# Patient Record
Sex: MALE | Race: WHITE | NOT HISPANIC OR LATINO | URBAN - METROPOLITAN AREA
[De-identification: names, ages, dates, MRNs, and addresses within clinical notes are randomized per-mention and may not be internally consistent; named-entity substitution may affect disease eponyms.]

---

## 2019-01-11 ENCOUNTER — OUTPATIENT (OUTPATIENT)
Dept: OUTPATIENT SERVICES | Facility: HOSPITAL | Age: 59
LOS: 1 days | End: 2019-01-11
Payer: COMMERCIAL

## 2019-01-11 VITALS
WEIGHT: 201.06 LBS | RESPIRATION RATE: 16 BRPM | DIASTOLIC BLOOD PRESSURE: 92 MMHG | HEIGHT: 68 IN | TEMPERATURE: 98 F | SYSTOLIC BLOOD PRESSURE: 152 MMHG | OXYGEN SATURATION: 98 % | HEART RATE: 57 BPM

## 2019-01-11 DIAGNOSIS — K40.30 UNILATERAL INGUINAL HERNIA, WITH OBSTRUCTION, WITHOUT GANGRENE, NOT SPECIFIED AS RECURRENT: ICD-10-CM

## 2019-01-11 DIAGNOSIS — Z92.89 PERSONAL HISTORY OF OTHER MEDICAL TREATMENT: Chronic | ICD-10-CM

## 2019-01-11 DIAGNOSIS — Z01.818 ENCOUNTER FOR OTHER PREPROCEDURAL EXAMINATION: ICD-10-CM

## 2019-01-11 DIAGNOSIS — E03.9 HYPOTHYROIDISM, UNSPECIFIED: ICD-10-CM

## 2019-01-11 LAB
ALBUMIN SERPL ELPH-MCNC: 4.2 G/DL — SIGNIFICANT CHANGE UP (ref 3.3–5)
ALP SERPL-CCNC: 52 U/L — SIGNIFICANT CHANGE UP (ref 40–120)
ALT FLD-CCNC: 37 U/L — SIGNIFICANT CHANGE UP (ref 12–78)
ANION GAP SERPL CALC-SCNC: 7 MMOL/L — SIGNIFICANT CHANGE UP (ref 5–17)
AST SERPL-CCNC: 19 U/L — SIGNIFICANT CHANGE UP (ref 15–37)
BILIRUB SERPL-MCNC: 0.5 MG/DL — SIGNIFICANT CHANGE UP (ref 0.2–1.2)
BUN SERPL-MCNC: 23 MG/DL — SIGNIFICANT CHANGE UP (ref 7–23)
CALCIUM SERPL-MCNC: 8.6 MG/DL — SIGNIFICANT CHANGE UP (ref 8.5–10.1)
CHLORIDE SERPL-SCNC: 108 MMOL/L — SIGNIFICANT CHANGE UP (ref 96–108)
CO2 SERPL-SCNC: 29 MMOL/L — SIGNIFICANT CHANGE UP (ref 22–31)
CREAT SERPL-MCNC: 0.9 MG/DL — SIGNIFICANT CHANGE UP (ref 0.5–1.3)
GLUCOSE SERPL-MCNC: 123 MG/DL — HIGH (ref 70–99)
HCT VFR BLD CALC: 39.9 % — SIGNIFICANT CHANGE UP (ref 39–50)
HGB BLD-MCNC: 13.9 G/DL — SIGNIFICANT CHANGE UP (ref 13–17)
MCHC RBC-ENTMCNC: 29.4 PG — SIGNIFICANT CHANGE UP (ref 27–34)
MCHC RBC-ENTMCNC: 34.8 GM/DL — SIGNIFICANT CHANGE UP (ref 32–36)
MCV RBC AUTO: 84.4 FL — SIGNIFICANT CHANGE UP (ref 80–100)
NRBC # BLD: 0 /100 WBCS — SIGNIFICANT CHANGE UP (ref 0–0)
PLATELET # BLD AUTO: 252 K/UL — SIGNIFICANT CHANGE UP (ref 150–400)
POTASSIUM SERPL-MCNC: 3.4 MMOL/L — LOW (ref 3.5–5.3)
POTASSIUM SERPL-SCNC: 3.4 MMOL/L — LOW (ref 3.5–5.3)
PROT SERPL-MCNC: 7 G/DL — SIGNIFICANT CHANGE UP (ref 6–8.3)
RBC # BLD: 4.73 M/UL — SIGNIFICANT CHANGE UP (ref 4.2–5.8)
RBC # FLD: 13.1 % — SIGNIFICANT CHANGE UP (ref 10.3–14.5)
SODIUM SERPL-SCNC: 144 MMOL/L — SIGNIFICANT CHANGE UP (ref 135–145)
WBC # BLD: 6.03 K/UL — SIGNIFICANT CHANGE UP (ref 3.8–10.5)
WBC # FLD AUTO: 6.03 K/UL — SIGNIFICANT CHANGE UP (ref 3.8–10.5)

## 2019-01-11 PROCEDURE — 36415 COLL VENOUS BLD VENIPUNCTURE: CPT

## 2019-01-11 PROCEDURE — 85027 COMPLETE CBC AUTOMATED: CPT

## 2019-01-11 PROCEDURE — 93010 ELECTROCARDIOGRAM REPORT: CPT | Mod: NC

## 2019-01-11 PROCEDURE — 93005 ELECTROCARDIOGRAM TRACING: CPT

## 2019-01-11 PROCEDURE — 80053 COMPREHEN METABOLIC PANEL: CPT

## 2019-01-11 PROCEDURE — G0463: CPT

## 2019-01-11 NOTE — H&P PST ADULT - FAMILY HISTORY
Mother  Still living? No  Family history of cerebrovascular accident (CVA), Age at diagnosis: Age Unknown     Father  Still living? Unknown  Family history of heart disease, Age at diagnosis: Age Unknown     Sibling  Still living? Yes, Estimated age: Age Unknown  Family history of type 2 diabetes mellitus, Age at diagnosis: Age Unknown

## 2019-01-11 NOTE — H&P PST ADULT - NSANTHOSAYNRD_GEN_A_CORE
No. ABENA screening performed.  STOP BANG Legend: 0-2 = LOW Risk; 3-4 = INTERMEDIATE Risk; 5-8 = HIGH Risk

## 2019-01-11 NOTE — H&P PST ADULT - HISTORY OF PRESENT ILLNESS
59 yo male scheduled for Right Inguinal Hernia Repair on 1/23/19 with Dr Duran.  Patient states that he has had the right inguinal hernia for a few weeks.  Patient has little pain intermittently. 57 yo male scheduled for Right Inguinal Hernia Repair on 1/23/19 with Dr Duran.  Patient states that he has had the right inguinal hernia for a few weeks.  Patient has a little pain intermittently.

## 2019-01-11 NOTE — H&P PST ADULT - PMH
Hypercholesterolemia    Hypothyroidism    Unilateral inguinal hernia with obstruction and without gangrene, recurrence not specified

## 2019-01-23 ENCOUNTER — OUTPATIENT (OUTPATIENT)
Dept: OUTPATIENT SERVICES | Facility: HOSPITAL | Age: 59
LOS: 1 days | End: 2019-01-23
Payer: COMMERCIAL

## 2019-01-23 VITALS
HEART RATE: 69 BPM | SYSTOLIC BLOOD PRESSURE: 119 MMHG | DIASTOLIC BLOOD PRESSURE: 69 MMHG | TEMPERATURE: 98 F | RESPIRATION RATE: 16 BRPM | OXYGEN SATURATION: 98 %

## 2019-01-23 VITALS
HEART RATE: 61 BPM | DIASTOLIC BLOOD PRESSURE: 71 MMHG | SYSTOLIC BLOOD PRESSURE: 122 MMHG | HEIGHT: 68 IN | TEMPERATURE: 98 F | OXYGEN SATURATION: 96 % | WEIGHT: 201.06 LBS | RESPIRATION RATE: 14 BRPM

## 2019-01-23 DIAGNOSIS — Z92.89 PERSONAL HISTORY OF OTHER MEDICAL TREATMENT: Chronic | ICD-10-CM

## 2019-01-23 DIAGNOSIS — K40.30 UNILATERAL INGUINAL HERNIA, WITH OBSTRUCTION, WITHOUT GANGRENE, NOT SPECIFIED AS RECURRENT: ICD-10-CM

## 2019-01-23 DIAGNOSIS — Z01.818 ENCOUNTER FOR OTHER PREPROCEDURAL EXAMINATION: ICD-10-CM

## 2019-01-23 PROCEDURE — 88304 TISSUE EXAM BY PATHOLOGIST: CPT | Mod: 26

## 2019-01-23 PROCEDURE — 49507 PRP I/HERN INIT BLOCK >5 YR: CPT | Mod: RT

## 2019-01-23 PROCEDURE — 88304 TISSUE EXAM BY PATHOLOGIST: CPT

## 2019-01-23 PROCEDURE — C1781: CPT

## 2019-01-23 RX ORDER — HYDROCODONE BITARTRATE AND ACETAMINOPHEN 7.5; 325 MG/15ML; MG/15ML
2 SOLUTION ORAL
Qty: 32 | Refills: 0
Start: 2019-01-23 | End: 2019-01-26

## 2019-01-23 RX ORDER — OXYCODONE HYDROCHLORIDE 5 MG/1
5 TABLET ORAL ONCE
Qty: 0 | Refills: 0 | Status: DISCONTINUED | OUTPATIENT
Start: 2019-01-23 | End: 2019-01-23

## 2019-01-23 RX ORDER — HYDROMORPHONE HYDROCHLORIDE 2 MG/ML
0.5 INJECTION INTRAMUSCULAR; INTRAVENOUS; SUBCUTANEOUS
Qty: 0 | Refills: 0 | Status: DISCONTINUED | OUTPATIENT
Start: 2019-01-23 | End: 2019-01-23

## 2019-01-23 RX ORDER — SODIUM CHLORIDE 9 MG/ML
1000 INJECTION, SOLUTION INTRAVENOUS
Qty: 0 | Refills: 0 | Status: DISCONTINUED | OUTPATIENT
Start: 2019-01-23 | End: 2019-01-23

## 2019-01-23 RX ORDER — ONDANSETRON 8 MG/1
4 TABLET, FILM COATED ORAL ONCE
Qty: 0 | Refills: 0 | Status: DISCONTINUED | OUTPATIENT
Start: 2019-01-23 | End: 2019-01-23

## 2019-01-23 RX ORDER — CEFAZOLIN SODIUM 1 G
1000 VIAL (EA) INJECTION ONCE
Qty: 0 | Refills: 0 | Status: COMPLETED | OUTPATIENT
Start: 2019-01-23 | End: 2019-01-23

## 2019-01-23 RX ADMIN — SODIUM CHLORIDE 75 MILLILITER(S): 9 INJECTION, SOLUTION INTRAVENOUS at 08:00

## 2019-01-23 NOTE — BRIEF OPERATIVE NOTE - POST-OP DX
Inguinal hernia  01/23/2019  Incarcerated Right Inguinal Hernia with Lipoma Spermatic Cord  Active  Mj Duran

## 2019-01-23 NOTE — BRIEF OPERATIVE NOTE - COMMENTS
Repair Incarcerated  Right Inguinal Hernia with Mesh. Excision Lipoma Cord, Ilioinguinal Nerve Block.

## 2019-01-23 NOTE — ASU DISCHARGE PLAN (ADULT/PEDIATRIC). - MEDICATION SUMMARY - MEDICATIONS TO TAKE
I will START or STAY ON the medications listed below when I get home from the hospital:    aspirin 81 mg oral tablet  -- 1 tab(s) by mouth once a day  -- Indication: For PROPHYLACTIC     Tylenol 325 mg oral tablet  -- 2 tab(s) by mouth every 4 hours, As Needed  -- Indication: For MILD TO MODERATE PAIN     Norco 5 mg-325 mg oral tablet  -- 2 tab(s) by mouth every 6 hours, As Needed -for severe pain MDD:8   -- Caution federal law prohibits the transfer of this drug to any person other  than the person for whom it was prescribed.  May cause drowsiness.  Alcohol may intensify this effect.  Use care when operating dangerous machinery.  This product contains acetaminophen.  Do not use  with any other product containing acetaminophen to prevent possible liver damage.  Using more of this medication than prescribed may cause serious breathing problems.    -- Indication: For SEVERE PAIN     Milk of Magnesia  -- 30 CC BY MOUTH  FOR CONSTIPATION    -- Indication: For CONSTIPATION     fenofibrate  -- orally once a day  -- Indication: For HIGH CHOLESTROL     Synthroid 175 mcg (0.175 mg) oral tablet  -- 1 tab(s) by mouth once a day    175/150 alt dose   -- Indication: For THYROID     multivitamin  -- orally once a day  -- Indication: For VITAMIN

## 2019-01-24 LAB — SURGICAL PATHOLOGY STUDY: SIGNIFICANT CHANGE UP

## 2019-12-17 ENCOUNTER — TRANSCRIPTION ENCOUNTER (OUTPATIENT)
Age: 59
End: 2019-12-17

## 2022-06-22 PROBLEM — Z00.00 ENCOUNTER FOR PREVENTIVE HEALTH EXAMINATION: Status: ACTIVE | Noted: 2022-06-22

## 2023-08-02 PROBLEM — E78.00 PURE HYPERCHOLESTEROLEMIA, UNSPECIFIED: Chronic | Status: ACTIVE | Noted: 2019-01-10

## 2023-08-02 PROBLEM — K40.30 UNILATERAL INGUINAL HERNIA, WITH OBSTRUCTION, WITHOUT GANGRENE, NOT SPECIFIED AS RECURRENT: Chronic | Status: ACTIVE | Noted: 2019-01-11

## 2023-08-02 PROBLEM — E03.9 HYPOTHYROIDISM, UNSPECIFIED: Chronic | Status: ACTIVE | Noted: 2019-01-10

## 2023-08-09 ENCOUNTER — APPOINTMENT (OUTPATIENT)
Dept: ELECTROPHYSIOLOGY | Facility: CLINIC | Age: 63
End: 2023-08-09

## 2023-08-10 ENCOUNTER — NON-APPOINTMENT (OUTPATIENT)
Age: 63
End: 2023-08-10

## 2023-08-10 ENCOUNTER — APPOINTMENT (OUTPATIENT)
Dept: ELECTROPHYSIOLOGY | Facility: CLINIC | Age: 63
End: 2023-08-10
Payer: COMMERCIAL

## 2023-08-10 VITALS — TEMPERATURE: 98.3 F | DIASTOLIC BLOOD PRESSURE: 68 MMHG | SYSTOLIC BLOOD PRESSURE: 118 MMHG

## 2023-08-10 VITALS
OXYGEN SATURATION: 95 % | DIASTOLIC BLOOD PRESSURE: 60 MMHG | SYSTOLIC BLOOD PRESSURE: 110 MMHG | HEART RATE: 60 BPM | HEIGHT: 68 IN | BODY MASS INDEX: 30.16 KG/M2 | WEIGHT: 199 LBS | RESPIRATION RATE: 14 BRPM

## 2023-08-10 DIAGNOSIS — G45.9 TRANSIENT CEREBRAL ISCHEMIC ATTACK, UNSPECIFIED: ICD-10-CM

## 2023-08-10 DIAGNOSIS — E07.9 DISORDER OF THYROID, UNSPECIFIED: ICD-10-CM

## 2023-08-10 DIAGNOSIS — I10 ESSENTIAL (PRIMARY) HYPERTENSION: ICD-10-CM

## 2023-08-10 DIAGNOSIS — Z86.69 PERSONAL HISTORY OF OTHER DISEASES OF THE NERVOUS SYSTEM AND SENSE ORGANS: ICD-10-CM

## 2023-08-10 DIAGNOSIS — I48.91 UNSPECIFIED ATRIAL FIBRILLATION: ICD-10-CM

## 2023-08-10 DIAGNOSIS — E78.5 HYPERLIPIDEMIA, UNSPECIFIED: ICD-10-CM

## 2023-08-10 PROCEDURE — 99244 OFF/OP CNSLTJ NEW/EST MOD 40: CPT

## 2023-08-10 PROCEDURE — 93000 ELECTROCARDIOGRAM COMPLETE: CPT

## 2023-08-10 RX ORDER — CHROMIUM 200 MCG
25 MCG TABLET ORAL
Refills: 0 | Status: ACTIVE | COMMUNITY
Start: 2023-08-10

## 2023-08-10 RX ORDER — LISINOPRIL 20 MG/1
20 TABLET ORAL DAILY
Refills: 0 | Status: ACTIVE | COMMUNITY
Start: 2023-08-10

## 2023-08-10 RX ORDER — GLUCOSAMINE/MSM/CHONDROIT SULF 500-166.6
595 (99 K) TABLET ORAL DAILY
Refills: 0 | Status: ACTIVE | COMMUNITY
Start: 2023-08-10

## 2023-08-10 RX ORDER — LEVOTHYROXINE SODIUM 150 UG/1
150 TABLET ORAL DAILY
Refills: 0 | Status: ACTIVE | COMMUNITY
Start: 2023-08-10

## 2023-08-10 RX ORDER — ATORVASTATIN CALCIUM 20 MG/1
20 TABLET, FILM COATED ORAL
Refills: 0 | Status: ACTIVE | COMMUNITY
Start: 2023-08-10

## 2023-08-10 RX ORDER — CHLORTHALIDONE 25 MG/1
25 TABLET ORAL DAILY
Refills: 0 | Status: ACTIVE | COMMUNITY
Start: 2023-08-10

## 2023-08-10 NOTE — PHYSICAL EXAM
[Well Developed] : well developed [Well Nourished] : well nourished [No Acute Distress] : no acute distress [Normal Conjunctiva] : normal conjunctiva [Normal Venous Pressure] : normal venous pressure [Normal S1, S2] : normal S1, S2 [No Murmur] : no murmur [Clear Lung Fields] : clear lung fields [Good Air Entry] : good air entry [No Respiratory Distress] : no respiratory distress  [Normal Gait] : normal gait [No Edema] : no edema [No Cyanosis] : no cyanosis [No Clubbing] : no clubbing [No Rash] : no rash [Moves all extremities] : moves all extremities [No Focal Deficits] : no focal deficits [Alert and Oriented] : alert and oriented

## 2023-08-10 NOTE — HISTORY OF PRESENT ILLNESS
[FreeTextEntry1] : 62 year old gentleman with history of TIA, HTN, HLD, thyroid nodule, ABENA, presenting for evaluation of atrial fibrillation.    In 4/2023 he presented with sudden onset double vision that lasted about 4 days. He was seen in [facility] and workup was negative, including MRI, MRA of brain and neck, as well as ophthalmologic workup. He was diagnosed with a TIA, and was initially started on ASA 81mg qd.   Subsequently he wore an event monitor which revealed paroxysmal AF with RVR.    Event monitor performed 5/2 - 5/16/23 revealed SR (avg 65, range  bpm) and paroxysmal AF with episodes lasting up to 2 hours and 23 minutes (avg rate 119, range  bpm) c/w 1% AF burden during monitoring   He denied symptoms during monitoring.    He was started on anticoagulation with Xarelto, and ASA was stopped. He is tolerating anticoagulation and denies significant bleeding.   TTE and prior stress testing have been normal.    He now endorses feeling occasional mild palpitation, which he describes as fluttering, for brief periods of time when he is laying down.  He denies other episodes of sustained or significantly bothersome palpitations.  He denies syncope.    He does have a history of sleep apnea and has in general been noncompliant with CPAP.  In addition he has been noted to have a thyroid nodule recent lab work was notable for a TSH of 0.43 (within normal range) however he had elevated thyroglobulin antibodies as well as TPO.  He is awaiting an evaluation with an endocrinologist.     Current meds include Xarelto 20mg qd, lisinopril, chlorthaldione, atorvastatin, Synthroid

## 2023-08-10 NOTE — CARDIOLOGY SUMMARY
[de-identified] : 8/10/23 sinus rhythm 60 bpm, narrow QRS [de-identified] : ETT 10/12/21 no ischemia or induced arrhythmia. HR increased from 58 to 144 bpm. 11 METS  [de-identified] : TTE 5/1/23 LVEF 62%, LA 3.5cm, mod MR, mild TR, RVSP 25,

## 2023-08-10 NOTE — DISCUSSION/SUMMARY
[FreeTextEntry1] : 62 year old gentleman with history of TIA, HTN, HLD, thyroid nodule, ABENA, presenting for evaluation of atrial fibrillation. He was recently found to have paroxysmal atrial fibrillation after presenting with a suspected TIA.  He now reports mild symptoms of infrequent palpitation.  Due to his suspected TIA he is now on anticoagulation, which he is tolerating.  We did discuss atrial fibrillation in detail, associated risks and risks of long-term morbidity.  We did discuss management options for atrial fibrillation in detail, including medical therapy as well as atrial fibrillation ablation.  As initial strategy we discussed the importance of risk factor control, including treatment of sleep apnea and treatment of thyroid disease as is indicated.  He reports he will follow-up with his sleep medicine doctor and consider CPAP or other appropriate therapies.  He also has an upcoming appointment with an endocrinologist and may require treatment for his thyroid nodule.  If he continues to have atrial fibrillation despite this I would consider an early rhythm control strategy, and the risks and benefits of AF ablation were discussed.  Will plan to repeat monitoring in the future and reconsider rhythm control strategies if he continues to have AF, particularly if the AF burden and symptoms progress. We did also discuss monitoring strategies, and he is interested in considering ILR implant for long-term monitoring, in the future; I suspect this will be helpful to guide further management.      -continue Xarelto   -f/u sleep medicine and reinitiate treatment for ABENA   -endocrinology followup and treatment of thyroid disease/nodule    -repeat MCOT in around 3-5 months, and EP follow-up after this. Will consider ILR  [EKG obtained to assist in diagnosis and management of assessed problem(s)] : EKG obtained to assist in diagnosis and management of assessed problem(s)

## 2023-08-10 NOTE — REASON FOR VISIT
[Arrhythmia/ECG Abnorrmalities] : arrhythmia/ECG abnormalities [Spouse] : spouse [FreeTextEntry3] : Dr Ling

## 2023-08-10 NOTE — REVIEW OF SYSTEMS
[Seeing Double (Diplopia)] : diplopia [Palpitations] : palpitations [Negative] : Heme/Lymph [SOB] : no shortness of breath [Dyspnea on exertion] : not dyspnea during exertion [Chest Discomfort] : no chest discomfort [Lower Ext Edema] : no extremity edema [Leg Claudication] : no intermittent leg claudication [Syncope] : no syncope [Dizziness] : no dizziness [Convulsions] : no convulsions [FreeTextEntry3] : per HPI

## 2023-08-14 NOTE — H&P PST ADULT - WEIGHT IN KG
[facility]  [address] [place] [address]  [place] [address]  Phone: [phone]  Primary Provider: Rere Campuzano  Pre-op Performing Provider: RERE CAMPUZANO      PREOPERATIVE EVALUATION:  Today's date: 8/14/2023    Case Epstein is a 63 year old male who presents for a preoperative evaluation.      8/14/2023     9:31 AM   Additional Questions   Roomed by Zoe Plata       Surgical Information:  Surgery/Procedure: Left total hip replacment  Surgery Location: [place]  Surgeon: Jer  Surgery Date: 08/22/2023  Time of Surgery: 10:30am  Where patient plans to recover: At home with family  Fax number for surgical facility: Note does not need to be faxed, will be available electronically in Epic.         Subjective       HPI related to upcoming procedure: 63 year old with very long history of hip problems now to have left GUSTAVO.  He had right GUSTAVO in May this year.         8/7/2023     9:38 AM   Preop Questions   1. Have you ever had a heart attack or stroke? No   2. Have you ever had surgery on your heart or blood vessels, such as a stent placement, a coronary artery bypass, or surgery on an artery in your head, neck, heart, or legs? No   3. Do you have chest pain with activity? No   4. Do you have a history of  heart failure? No   5. Do you currently have a cold, bronchitis or symptoms of other infection? No   6. Do you have a cough, shortness of breath, or wheezing? No   7. Do you or anyone in your family have previous history of blood clots? No   8. Do you or does anyone in your family have a serious bleeding problem such as prolonged bleeding following surgeries or cuts? No   9. Have you ever had problems with anemia or been told to take iron pills? No   10. Have you had any abnormal blood loss such as black, tarry or bloody stools? No   11. Have you ever had a blood transfusion? No   12. Are you willing to have a blood transfusion if it is medically needed before, during, or after your  surgery? Yes   13. Have you or any of your relatives ever had problems with anesthesia? No   14. Do you have sleep apnea, excessive snoring or daytime drowsiness? No   15. Do you have any artifical heart valves or other implanted medical devices like a pacemaker, defibrillator, or continuous glucose monitor? No   16. Do you have artificial joints? YES - left GUSTAVO this year    17. Are you allergic to latex? No       Health Care Directive:  Patient does not have a Health Care Directive or Living Will:     Preoperative Review of :  Patient not on any controlled substances           Review of Systems  Constitutional, neuro, ENT, endocrine, pulmonary, cardiac, gastrointestinal, genitourinary, musculoskeletal, integument and psychiatric systems are negative, except as otherwise noted.    No recent illnesses    No problems after prior hip procedure on right.  That hip feels fine, no pain.    No cardiac history.     No chest pain/ breathing problesm.    No bleeeding or clotting disorders.    No anesthesia problems    Patient Active Problem List    Diagnosis Date Noted    Primary osteoarthritis of right hip 06/05/2023     Priority: Medium    Pain in joint, ankle and foot, left 01/08/2018     Priority: Medium    CARDIOVASCULAR SCREENING; LDL GOAL LESS THAN 100 08/16/2017     Priority: Medium    Advanced directives, counseling/discussion 07/03/2015     Priority: Medium     Advance Care Planning 7/7/2015: ACP Review and Resources Provided:  Reviewed chart for advance care plan.  Case Epstein has no plan or code status on file. Discussed available resources and provided with information. Confirmed code status reflects current choices pending further ACP discussions.  Confirmed/documented legally designated decision maker(s).  Added by Noemi Sales            Vitamin D deficiency disease 06/26/2013     Priority: Medium    Primary osteoarthritis of right knee 09/27/2011     Priority: Medium      Past Medical History:  "  Diagnosis Date    High cholesterol     OA (OSTEOARTHRITIS) OF KNEE - right 09/27/2011     Past Surgical History:   Procedure Laterality Date    ARTHRODESIS ANKLE Left 11/20/2017    Procedure: ARTHRODESIS ANKLE;  LEFT ANKLE FUSION (C-ARM);  Surgeon: Shaggy Linn MD;  Location: [facility]    ARTHROPLASTY HIP ANTERIOR Right 5/30/2023    Procedure: Right ARTHROPLASTY HIP TOTAL DIRECT ANTERIOR APPROACH;  Surgeon: Dennis Randle MD;  Location: [facility] OR    COLONOSCOPY      ORTHOPEDIC SURGERY Right     right TKA May 2021    ZZC AFF PALATE/UVULA SURGERY UNLISTED  age 2 and in 1986    2 procedures for palate/nasal issues     Current Outpatient Medications   Medication Sig Dispense Refill    acetaminophen (TYLENOL) 325 MG tablet Take 2 tablets (650 mg) by mouth every 4 hours as needed for other (mild pain) 100 tablet 0    amLODIPine (NORVASC) 5 MG tablet Take 1 tablet (5 mg) by mouth daily 90 tablet 3    aspirin (ASA) 325 MG EC tablet Take 1 tablet (325 mg) by mouth daily 30 tablet 0    ibuprofen (ADVIL/MOTRIN) 200 MG tablet Take 200 mg by mouth every 4 hours as needed for mild pain         No Known Allergies     Social History     Tobacco Use    Smoking status: Never    Smokeless tobacco: Never   Substance Use Topics    Alcohol use: No     Comment: occasional        History   Drug Use No         Objective     /72 (BP Location: Right arm, Patient Position: Chair, Cuff Size: Adult Regular)   Pulse 87   Temp 98  F (36.7  C) (Temporal)   Resp 16   Ht 1.753 m (5' 9\")   Wt 80.9 kg (178 lb 4 oz)   SpO2 96%   BMI 26.32 kg/m      Physical Exam    GENERAL APPEARANCE: healthy, alert and no distress     EYES: EOMI,  PERRL     HENT: ear canals and TM's normal and nose and mouth without ulcers or lesions.  He has chronic palate changes from past surgeries     NECK: no adenopathy, no asymmetry, masses, or scars and thyroid normal to palpation     RESP: lungs clear to auscultation - no rales, rhonchi or wheezes     CV: " regular rates and rhythm, normal S1 S2, no S3 or S4 and no murmur, click or rub     ABDOMEN:  soft, nontender, no HSM or masses and bowel sounds normal     MS: extremities normal- no gross deformities noted, no evidence of inflammation in joints, FROM in all extremities.     SKIN: no suspicious lesions or rashes     NEURO: Normal strength and tone, sensory exam grossly normal, mentation intact and speech normal     PSYCH: mentation appears normal. and affect normal/bright     LYMPHATICS: No cervical adenopathy    Recent Labs   Lab Test 05/31/23  0958 05/31/23  0511 04/24/23  0852   HGB  --  11.1*  11.1* 15.6   PLT  --  192 168     --  139   POTASSIUM 4.2  --  4.2   CR 0.92  --  0.82   A1C  --   --  5.3        Diagnostics:      No EKG needed given no cardiac history/ symptoms    Labs pending, cmp and cbc    ASSESSMENT / PLAN:  (Z01.818) Preop general physical exam  (primary encounter diagnosis)  Comment: patient in stable health overall.  He has no problems with right GUSTAVO in May.  Plan: Comprehensive metabolic panel, CBC with         Platelets & Differential             (M16.12) Primary osteoarthritis of left hip  Comment: as above   Plan: as above     Patient will hold the aspirin one week prior    Take amlodipine the day of surgery early in am       I reviewed the patient's medications, allergies, medical history, family history, and social history.    Tushar Granados MD      Revised Cardiac Risk Index (RCRI):  The patient has the following serious cardiovascular risks for perioperative complications:   - No serious cardiac risks = 0 points     RCRI Interpretation: 0 points: Class I (very low risk - 0.4% complication rate)         Signed Electronically by: Tushar Granados MD  Copy of this evaluation report is provided to requesting physician.        91.2

## 2023-12-19 ENCOUNTER — OUTPATIENT (OUTPATIENT)
Dept: OUTPATIENT SERVICES | Facility: HOSPITAL | Age: 63
LOS: 1 days | End: 2023-12-19
Payer: COMMERCIAL

## 2023-12-19 ENCOUNTER — TRANSCRIPTION ENCOUNTER (OUTPATIENT)
Age: 63
End: 2023-12-19

## 2023-12-19 VITALS
HEIGHT: 68 IN | TEMPERATURE: 98 F | WEIGHT: 199.96 LBS | HEART RATE: 52 BPM | RESPIRATION RATE: 18 BRPM | SYSTOLIC BLOOD PRESSURE: 135 MMHG | OXYGEN SATURATION: 99 % | DIASTOLIC BLOOD PRESSURE: 71 MMHG

## 2023-12-19 VITALS
SYSTOLIC BLOOD PRESSURE: 135 MMHG | HEART RATE: 53 BPM | TEMPERATURE: 98 F | HEIGHT: 68 IN | WEIGHT: 199.96 LBS | DIASTOLIC BLOOD PRESSURE: 71 MMHG | RESPIRATION RATE: 16 BRPM

## 2023-12-19 DIAGNOSIS — Z92.89 PERSONAL HISTORY OF OTHER MEDICAL TREATMENT: Chronic | ICD-10-CM

## 2023-12-19 DIAGNOSIS — I48.0 PAROXYSMAL ATRIAL FIBRILLATION: ICD-10-CM

## 2023-12-19 DIAGNOSIS — Z98.890 OTHER SPECIFIED POSTPROCEDURAL STATES: Chronic | ICD-10-CM

## 2023-12-19 PROCEDURE — 33285 INSJ SUBQ CAR RHYTHM MNTR: CPT

## 2023-12-19 PROCEDURE — C1764: CPT

## 2023-12-19 RX ORDER — ACETAMINOPHEN 500 MG
2 TABLET ORAL
Qty: 0 | Refills: 0 | DISCHARGE

## 2023-12-19 RX ORDER — MAGNESIUM HYDROXIDE 400 MG/1
0 TABLET, CHEWABLE ORAL
Qty: 0 | Refills: 0 | DISCHARGE

## 2023-12-19 RX ORDER — RIVAROXABAN 15 MG-20MG
1 KIT ORAL
Refills: 0 | DISCHARGE

## 2023-12-19 RX ORDER — LEVOTHYROXINE SODIUM 125 MCG
1 TABLET ORAL
Qty: 0 | Refills: 0 | DISCHARGE

## 2023-12-19 RX ORDER — CEPHALEXIN 500 MG
500 CAPSULE ORAL ONCE
Refills: 0 | Status: COMPLETED | OUTPATIENT
Start: 2023-12-19 | End: 2023-12-19

## 2023-12-19 RX ORDER — POTASSIUM CHLORIDE 20 MEQ
1 PACKET (EA) ORAL
Refills: 0 | DISCHARGE

## 2023-12-19 RX ORDER — ERGOCALCIFEROL 1.25 MG/1
1 CAPSULE ORAL
Refills: 0 | DISCHARGE

## 2023-12-19 RX ORDER — CHLORTHALIDONE 50 MG
1 TABLET ORAL
Refills: 0 | DISCHARGE

## 2023-12-19 RX ORDER — LISINOPRIL 2.5 MG/1
1 TABLET ORAL
Refills: 0 | DISCHARGE

## 2023-12-19 RX ORDER — FENOFIBRATE,MICRONIZED 130 MG
0 CAPSULE ORAL
Qty: 0 | Refills: 0 | DISCHARGE

## 2023-12-19 RX ORDER — ASPIRIN/CALCIUM CARB/MAGNESIUM 324 MG
1 TABLET ORAL
Qty: 0 | Refills: 0 | DISCHARGE

## 2023-12-19 RX ORDER — ATORVASTATIN CALCIUM 80 MG/1
1 TABLET, FILM COATED ORAL
Refills: 0 | DISCHARGE

## 2023-12-19 RX ORDER — LEVOTHYROXINE SODIUM 125 MCG
1 TABLET ORAL
Refills: 0 | DISCHARGE

## 2023-12-19 RX ADMIN — Medication 500 MILLIGRAM(S): at 07:23

## 2023-12-19 NOTE — ASU DISCHARGE PLAN (ADULT/PEDIATRIC) - NS MD DC FALL RISK RISK
For information on Fall & Injury Prevention, visit: [email or web address] OR  [email or web address]://[email or web address]-injury OR  https://www.cdc.gov/steadi/patient.html For information on Fall & Injury Prevention, visit: [email or web address]://[email or web address]-[email or web address]-mobility OR  [email or web address]://[email or web address].[email or web address]-[email or web address]-[email or web address]-avoid-injury OR  https://www.cdc.gov/steadi/patient.html

## 2023-12-19 NOTE — PROGRESS NOTE ADULT - SUBJECTIVE AND OBJECTIVE BOX
Procedure: MDT Loop recorder implant  Electrophysiologist: Saad Berry MD    Pt doing well s/p loop recorder implant. Denies any complaints post procedure.     Incision: dressing C/D/I; no bleeding, hematoma, erythema, exudate or edema    Plan:   Resume PO intake.   Ambulate w/ assist, then progress as tolerated.     Pain control with PO analgesia PRN.   Resume home medications.   D/C home once all criteria met, with outpt f/up in 1-2 weeks.

## 2023-12-19 NOTE — DISCHARGE NOTE NURSING/CASE MANAGEMENT/SOCIAL WORK - NSDCPEFALRISK_GEN_ALL_CORE
For information on Fall & Injury Prevention, visit: [email or web address]-[email or web address]-mobility OR  [email or web address]://[email or web address].[email or web address]/[email or web address]-prevention-tips-[email or web address]-injury OR  https://www.cdc.gov/steadi/patient.html For information on Fall & Injury Prevention, visit: [email or web address]://[email or web address]-[email or web address]-[email or web address]-mobility OR  [email or web address]://[email or web address]-tips-to-avoid-injury OR  https://www.cdc.gov/steadi/patient.html

## 2023-12-19 NOTE — ASU DISCHARGE PLAN (ADULT/PEDIATRIC) - PROVIDER TOKENS
PROVIDER:[TOKEN:[[id number]]],PROVIDER:[TOKEN:[[id number]]] PROVIDER:[TOKEN:[[id number]]],PROVIDER:[TOKEN:[[id number]]]

## 2023-12-19 NOTE — H&P PST ADULT - ASSESSMENT
Luis is a 62 y/o male, PMHx significant for TIA, HTN, HLD, thyroid nodule, ABENA, and PAF who presents today for elective loop recorder implant with Dr. Berry. In summary, pt was diagnosed with a TIA in 4/2023 after presenting with sudden onset double vision that lasted about 4 days. He was seen in [facility] and workup was negative, including MRI, MRA of brain and neck, as well as ophthalmologic workup. He was diagnosed with a TIA, and was initially started on ASA 81mg qd. Subsequently he wore an event monitor which revealed paroxysmal AF with RVR. Event monitor performed 5/2 - 5/16/23 revealed SR (avg 65, range  bpm) and paroxysmal AF with episodes lasting up to 2 hours and 23 minutes (avg rate 119, range  bpm) c/w 1% AF burden during monitoring. He was started on anticoagulation with Xarelto, and ASA was stopped. Pt still reports intermittent palpitations which sometimes are exacerbated with physical activity. Pt now presents electively for ILR implant to help guide arrythmia management options.     Plan:  1)Loop recorder implant  - Consent with attending  - Keflex 500mg prior to procedure   - Site prep per protocol     Luis is a 64 y/o male, PMHx significant for TIA, HTN, HLD, thyroid nodule, ABENA, and PAF who presents today for elective loop recorder implant with Dr. Berry. In summary, pt was diagnosed with a TIA in 4/2023 after presenting with sudden onset double vision that lasted about 4 days. He was seen in [facility] and workup was negative, including MRI, MRA of brain and neck, as well as ophthalmologic workup. He was diagnosed with a TIA, and was initially started on ASA 81mg qd. Subsequently he wore an event monitor which revealed paroxysmal AF with RVR. Event monitor performed 5/2 - 5/16/23 revealed SR (avg 65, range  bpm) and paroxysmal AF with episodes lasting up to 2 hours and 23 minutes (avg rate 119, range  bpm) c/w 1% AF burden during monitoring. He was started on anticoagulation with Xarelto, and ASA was stopped. Pt still reports intermittent palpitations which sometimes are exacerbated with physical activity. Pt now presents electively for ILR implant to help guide arrythmia management options.     Plan:  1)Loop recorder implant  - Consent with attending  - Keflex 500mg prior to procedure   - Site prep per protocol

## 2023-12-19 NOTE — H&P PST ADULT - NSICDXPASTMEDICALHX_GEN_ALL_CORE_FT
PAST MEDICAL HISTORY:  Hypercholesterolemia     Hypothyroidism     PAF (paroxysmal atrial fibrillation)     Unilateral inguinal hernia with obstruction and without gangrene, recurrence not specified

## 2023-12-19 NOTE — H&P PST ADULT - NSICDXFAMILYHX_GEN_ALL_CORE_FT
FAMILY HISTORY:  Father  Still living? Unknown  Family history of heart disease, Age at diagnosis: Age Unknown    Mother  Still living? No  Family history of cerebrovascular accident (CVA), Age at diagnosis: Age Unknown    Sibling  Still living? Yes, Estimated age: Age Unknown  Family history of type 2 diabetes mellitus, Age at diagnosis: Age Unknown

## 2023-12-19 NOTE — DISCHARGE NOTE NURSING/CASE MANAGEMENT/SOCIAL WORK - PATIENT PORTAL LINK FT
You can access the FollowMyHealth Patient Portal offered by [facility] by registering at the following website: [email or web address]. By joining [email or web address]’s FollowMyHealth portal, you will also be able to view your health information using other applications (apps) compatible with our system. You can access the FollowMyHealth Patient Portal offered by [facility] by registering at the following website: [email or web address]. By joining "[employer]’s FollowMyHealth portal, you will also be able to view your health information using other applications (apps) compatible with our system.

## 2023-12-19 NOTE — H&P PST ADULT - HISTORY OF PRESENT ILLNESS
Luis is a 62 y/o male, PMHx significant for TIA, HTN, HLD, thyroid nodule, ABENA, and PAF who presents today for elective loop recorder implant with Dr. Berry. In summary, pt was diagnosed with a TIA in 2023 after presenting with sudden onset double vision that lasted about 4 days. He was seen in [facility] and workup was negative, including MRI, MRA of brain and neck, as well as ophthalmologic workup. He was diagnosed with a TIA, and was initially started on ASA 81mg qd. Subsequently he wore an event monitor which revealed paroxysmal AF with RVR. Event monitor performed  - 23 revealed SR (avg 65, range  bpm) and paroxysmal AF with episodes lasting up to 2 hours and 23 minutes (avg rate 119, range  bpm) c/w 1% AF burden during monitoring. He was started on anticoagulation with Xarelto, and ASA was stopped. Pt still reports intermittent palpitations which sometimes are exacerbated with physical activity. Pt now presents electively for ILR implant to help guide arrythmia management options.       Cardiology Summary:   EC/10/23 sinus rhythm 60 bpm, narrow QRS    Stress Test: ETT 10/12/21 no ischemia or induced arrhythmia. HR increased from 58 to 144 bpm. 11 METS    Echo: TTE 23 LVEF 62%, LA 3.5cm, mod MR, mild TR, RVSP 25, Luis is a 64 y/o male, PMHx significant for TIA, HTN, HLD, thyroid nodule, ABENA, and PAF who presents today for elective loop recorder implant with Dr. Berry. In summary, pt was diagnosed with a TIA in 2023 after presenting with sudden onset double vision that lasted about 4 days. He was seen in [facility] and workup was negative, including MRI, MRA of brain and neck, as well as ophthalmologic workup. He was diagnosed with a TIA, and was initially started on ASA 81mg qd. Subsequently he wore an event monitor which revealed paroxysmal AF with RVR. Event monitor performed  - 23 revealed SR (avg 65, range  bpm) and paroxysmal AF with episodes lasting up to 2 hours and 23 minutes (avg rate 119, range  bpm) c/w 1% AF burden during monitoring. He was started on anticoagulation with Xarelto, and ASA was stopped. Pt still reports intermittent palpitations which sometimes are exacerbated with physical activity. Pt now presents electively for ILR implant to help guide arrythmia management options.       Cardiology Summary:   EC/10/23 sinus rhythm 60 bpm, narrow QRS    Stress Test: ETT 10/12/21 no ischemia or induced arrhythmia. HR increased from 58 to 144 bpm. 11 METS    Echo: TTE 23 LVEF 62%, LA 3.5cm, mod MR, mild TR, RVSP 25,

## 2023-12-19 NOTE — ASU DISCHARGE PLAN (ADULT/PEDIATRIC) - CARE PROVIDER_API CALL
Romulo Ling  Cardiology  [address], [address]  [place] [address]  Phone: [phone]  Fax: [phone]  Follow Up Time:     Saad Berry  Cardiac Electrophysiology  [address] [place] [id number]  Phone: [phone]  Fax: [phone]  Follow Up Time:    Romulo Ling  Cardiology  [address], [address]  [place] [address]  Phone: [phone]  Fax: [phone]  Follow Up Time:     Saad Berry  Cardiac Electrophysiology  [address] [place] [address]  Phone: [phone]  Fax: [phone]  Follow Up Time:

## 2023-12-19 NOTE — ASU DISCHARGE PLAN (ADULT/PEDIATRIC) - COMMENTS
Follow up with Marianne Mendez NP at [facility] in 2-3 weeks for loop recorder check. Please call [phone] to schedule an appointment. Follow up with Marianne Mendez NP at [facility] in 2-3 weeks for loop recorder check. Please call [phone] to schedule an appointment.

## 2024-01-11 PROBLEM — I48.0 PAROXYSMAL ATRIAL FIBRILLATION: Chronic | Status: ACTIVE | Noted: 2023-12-19

## 2024-01-12 ENCOUNTER — APPOINTMENT (OUTPATIENT)
Dept: ORTHOPEDIC SURGERY | Facility: CLINIC | Age: 64
End: 2024-01-12
Payer: COMMERCIAL

## 2024-01-12 VITALS — HEIGHT: 68 IN | BODY MASS INDEX: 30.16 KG/M2 | WEIGHT: 199 LBS

## 2024-01-12 DIAGNOSIS — M10.062 IDIOPATHIC GOUT, LEFT KNEE: ICD-10-CM

## 2024-01-12 PROCEDURE — 99203 OFFICE O/P NEW LOW 30 MIN: CPT

## 2024-01-12 RX ORDER — METHYLPREDNISOLONE 4 MG/1
4 TABLET ORAL
Qty: 1 | Refills: 0 | Status: ACTIVE | COMMUNITY
Start: 2024-01-12 | End: 1900-01-01

## 2024-01-12 NOTE — ASSESSMENT
[FreeTextEntry1] : 2-week history of left knee pain with associated swelling.  Has had gout in the past and tells me that this feels like a typical attack.  He tells me this is managed by his general practitioner but lately the medications have not been working.  We discussed a referral to see a rheumatologist will he is adamant about this.  We discussed an x-ray to assess any other causes for the swelling he is adamant about this as well.  Explained gout is a medical issue and usually best handled by a rheumatologist or his general practitioner.  He verbalized understanding.  Will start him on a Medrol Dosepak but I explained that the disease needs to be managed with maintenance medications and when the maintenance medications are not working different medications could be instituted.  He will follow-up as needed with me at this time   The patient's current medication management of their orthopedic diagnosis was reviewed today:(1) We discussed a comprehensive treatment plan that included pharmaceutical management involving the use of prescription medications. (2) There is a moderate risk of morbidity with further treatment, especially from use of prescription strength medications and possible side effects of these medications which include upset stomach with oral medications, skin reactions to topical medications and cardiac/renal/diabetes issues with long term use. (3) I recommended that the patient follow-up with their medical physician to discuss any significant specific potential issues with long term medication use such as interactions with current medications or with exacerbation of underlying medical comorbidities. (4) The benefits and risks associated with use of injectable, oral or topical, prescription and over the counter anti-inflammatory medications were discussed with the patient. The patient voiced understanding of the risks including but not limited to bleeding, stroke, kidney dysfunction, heart disease, and were referred to the black box warning label for further information.

## 2024-01-12 NOTE — IMAGING
[de-identified] : Mild effusion, no warmth, no ecchymosis Medial and lateral joint line tenderness to palpation Range of motion 0-110 5/5 quadriceps and hamstring strength Positive [facility] No varus or valgus instability, negative lachman Motor and sensory intact distally Mildly antalgic gait

## 2024-01-12 NOTE — HISTORY OF PRESENT ILLNESS
[9] : 9 [5] : 5 [Dull/Aching] : dull/aching [Sharp] : sharp [Intermittent] : intermittent [Nothing helps with pain getting better] : Nothing helps with pain getting better [de-identified] : 1/12/2024: LT Knee pain for years, Pt has been treated for gout in knee in the past. Has not had a Gout flare-up for 2 weeks. Denies injury, denies N/T.  [] : no [FreeTextEntry1] : LT Knee

## 2024-02-06 DIAGNOSIS — Z86.69 PERSONAL HISTORY OF OTHER DISEASES OF THE NERVOUS SYSTEM AND SENSE ORGANS: ICD-10-CM

## 2024-02-06 DIAGNOSIS — Z86.79 PERSONAL HISTORY OF OTHER DISEASES OF THE CIRCULATORY SYSTEM: ICD-10-CM

## 2024-02-06 RX ORDER — RIVAROXABAN 20 MG/1
20 TABLET, FILM COATED ORAL
Qty: 90 | Refills: 2 | Status: ACTIVE | COMMUNITY
Start: 2023-08-10

## 2024-02-07 ENCOUNTER — APPOINTMENT (OUTPATIENT)
Dept: ELECTROPHYSIOLOGY | Facility: CLINIC | Age: 64
End: 2024-02-07
Payer: COMMERCIAL

## 2024-02-07 VITALS
BODY MASS INDEX: 30.16 KG/M2 | HEIGHT: 68 IN | DIASTOLIC BLOOD PRESSURE: 76 MMHG | HEART RATE: 53 BPM | SYSTOLIC BLOOD PRESSURE: 124 MMHG | WEIGHT: 199 LBS

## 2024-02-07 PROCEDURE — 93000 ELECTROCARDIOGRAM COMPLETE: CPT

## 2024-02-07 PROCEDURE — 99215 OFFICE O/P EST HI 40 MIN: CPT

## 2024-02-07 PROCEDURE — G2211 COMPLEX E/M VISIT ADD ON: CPT

## 2024-02-07 NOTE — DISCUSSION/SUMMARY
[FreeTextEntry1] : 63 year old gentleman with history of TIA, HTN, HLD, thyroid nodule, ABENA, and paroxysmal atrial fibrillation presenting for followup s/p ILR implant 12/19/23. He has recently had a few episodes of symptomatic pAF with RVR, with last episode confirmed on ILR implant lasting >4 hours. He reports mild-moderate associated symptoms, and episodes recently every 1-2 months.  We again discussed AF in detail, and management options. He is reluctant to take additional medications at this point, though he is tolerating anticoagulation and will continue this. We discussed the pros and cons of an early rhythm control strategy, and AF ablation. The risks and benefits of AF ablation were reviewed. He does want to consider AF ablation, but will see if he has further episodes first. In the interim will focus on risk factor reduction, including increased exercise, weight loss, as well as evaluation and consideration for treatment of sleep apnea. -continue Xarelto -continue ILR monitoring -focus on risk factor control -will arrange home sleep study and further evaluation with sleep medicine -EP followup in 3-4 months or prn for further discussion/review of rhythm control/ablation [EKG obtained to assist in diagnosis and management of assessed problem(s)] : EKG obtained to assist in diagnosis and management of assessed problem(s)

## 2024-02-07 NOTE — HISTORY OF PRESENT ILLNESS
[FreeTextEntry1] : 63 year old gentleman with history of TIA, HTN, HLD, thyroid nodule, ABENA, and paroxysmal atrial fibrillation presenting for followup s/p ILR implant 12/19/23.   In 4/2023 he presented with sudden onset double vision that lasted about 4 days. He was seen in [facility] and workup was negative, including MRI, MRA of brain and neck, as well as ophthalmologic workup. This was thought to be a suspected TIA, and was initially started on ASA 81mg qd.   Subsequently he wore an event monitor which revealed paroxysmal AF with RVR, and anticoagulation was changed to Xarelto (ASA stopped).   Event monitor performed 5/2 - 5/16/23 revealed SR (avg 65, range  bpm) and paroxysmal AF with episodes lasting up to 2 hours and 23 minutes (avg rate 119, range  bpm) c/w 1% AF burden during monitoring. He denied symptoms during monitoring.  TTE and prior stress testing have been normal.   An ILR was implanted 12/19/23 for further monitoring.  On ILR there was an episode of pAF with RVR on 12/24/23, lasting 4 hrs and 16 minutes, avg rate 111, max 176 bpm. He reports being in bed at rest, and had symptoms of palpitation which were mildly uncomfortable.  He had another episode in past while walking during which he felt exertional dyspnea/fatigue.  He is active in general but does not exercise regularly.   He is tolerating anticoagulation and denies significant bleeding.   He does have a history of sleep apnea but was unable to tolerate CPAP- he is not sure if he still has ABENA and reports he does not snore or wake  up at night.  He has thyroid nodules and follows with his PMD. He is on thyroid replacement and thyroid levels have been monitored. He has not yet seen an endocrinologist.   ECG today reveal ssinus rhythm 53 bpm, with  ms (first degere AVB) and narrow QRS Current meds include Xarelto 20mg qd, lisinopril, chlorthaldione, atorvastatin, Synthroid

## 2024-02-07 NOTE — REASON FOR VISIT
[Arrhythmia/ECG Abnorrmalities] : arrhythmia/ECG abnormalities [Spouse] : spouse [FreeTextEntry1] : INCOMPLETE NOTE [FreeTextEntry3] : Dr Ling

## 2024-02-07 NOTE — CARDIOLOGY SUMMARY
[de-identified] : 2/7/24 sinus rhythm 53 bpm, with  ms (first degree AVB) and narrow QRS 8/10/23 sinus rhythm 60 bpm, narrow QRS [de-identified] : ETT 10/12/21 no ischemia or induced arrhythmia. HR increased from 58 to 144 bpm. 11 METS  [de-identified] : TTE 5/1/23 LVEF 62%, LA 3.5cm, mod MR, mild TR, RVSP 25,

## 2024-02-21 ENCOUNTER — OUTPATIENT (OUTPATIENT)
Dept: OUTPATIENT SERVICES | Facility: HOSPITAL | Age: 64
LOS: 1 days | End: 2024-02-21
Payer: COMMERCIAL

## 2024-02-21 DIAGNOSIS — Z92.89 PERSONAL HISTORY OF OTHER MEDICAL TREATMENT: Chronic | ICD-10-CM

## 2024-02-21 DIAGNOSIS — Z98.890 OTHER SPECIFIED POSTPROCEDURAL STATES: Chronic | ICD-10-CM

## 2024-02-21 DIAGNOSIS — G47.33 OBSTRUCTIVE SLEEP APNEA (ADULT) (PEDIATRIC): ICD-10-CM

## 2024-02-21 PROCEDURE — 95800 SLP STDY UNATTENDED: CPT | Mod: 26

## 2024-02-21 PROCEDURE — 95800 SLP STDY UNATTENDED: CPT

## 2024-05-27 NOTE — PHYSICAL EXAM
[Well Developed] : well developed [Well Nourished] : well nourished [No Acute Distress] : no acute distress [Normal Conjunctiva] : normal conjunctiva [Normal Venous Pressure] : normal venous pressure [Normal S1, S2] : normal S1, S2 [No Respiratory Distress] : no respiratory distress  [Normal Gait] : normal gait [No Edema] : no edema [No Cyanosis] : no cyanosis [No Clubbing] : no clubbing [No Rash] : no rash [Moves all extremities] : moves all extremities [No Focal Deficits] : no focal deficits [Alert and Oriented] : alert and oriented

## 2024-05-29 ENCOUNTER — APPOINTMENT (OUTPATIENT)
Dept: ELECTROPHYSIOLOGY | Facility: CLINIC | Age: 64
End: 2024-05-29
Payer: COMMERCIAL

## 2024-05-29 VITALS
SYSTOLIC BLOOD PRESSURE: 120 MMHG | BODY MASS INDEX: 30.77 KG/M2 | DIASTOLIC BLOOD PRESSURE: 70 MMHG | WEIGHT: 203 LBS | HEIGHT: 68 IN | HEART RATE: 57 BPM

## 2024-05-29 PROCEDURE — 99215 OFFICE O/P EST HI 40 MIN: CPT | Mod: 25

## 2024-05-29 PROCEDURE — 93000 ELECTROCARDIOGRAM COMPLETE: CPT

## 2024-05-29 NOTE — CARDIOLOGY SUMMARY
[de-identified] : 5/29/24 sinus rhythm 57 bpm,  ms,  ms 2/7/24 sinus rhythm 53 bpm, with  ms (first degree AVB) and narrow QRS 8/10/23 sinus rhythm 60 bpm, narrow QRS [de-identified] : ETT 10/12/21 no ischemia or induced arrhythmia. HR increased from 58 to 144 bpm. 11 METS  [de-identified] : TTE 5/1/23 LVEF 62%, LA 3.5cm, mod MR, mild TR, RVSP 25,

## 2024-05-29 NOTE — DISCUSSION/SUMMARY
[FreeTextEntry1] : 63 year old gentleman with history of TIA, HTN, HLD, thyroid nodule, ABENA, and paroxysmal atrial fibrillation  s/p ILR implant 12/19/23, presenting for followup. Recently he has had increasingly frequent episodes of pAF, occuiring about 2x per month, with episode confirmed on ILR implant lasting up to 6 hours. He reports mild-moderate associated symptoms including dyspnea. We again discussed AF in detail, and management options. He is reluctant to take additional medications at this point, and may not tolerate further medical therapy well due to his sinus bradycardia.  He is tolerating anticoagulation and will continue this for now. We discussed the pros and cons of an early rhythm control strategy, and AF ablation. The risks and benefits of AF ablation were again reviewed, and he does want to plan to proceed with this, likely after the summer.   We also discussed the importance of continued risk factor reduction, including increased exercise, weight loss, as well as initiation of treatment of sleep apnea. His BP has been well controlled, and he would like to trial off chlorthaldione.   -AF ablation. Continue Xarelto through day prior to procedure.  -will reevaluate need for long-term anticoagulation following ablation, but he understands he will need to stay on it for at least several months after ablation. He does not think his initial visual disturbance was a TIA as imaging was negative.  -continue ILR monitoring -f/u for treatment of ABENA with sleep medicine -trial off chlorthaldione. continue lisinopril. Home BP monitoring to ensure he stays at goal <130/80 [EKG obtained to assist in diagnosis and management of assessed problem(s)] : EKG obtained to assist in diagnosis and management of assessed problem(s)

## 2024-05-29 NOTE — HISTORY OF PRESENT ILLNESS
[FreeTextEntry1] : 63 year old gentleman with history of TIA, HTN, HLD, thyroid nodule, ABENA, and paroxysmal atrial fibrillation s/p ILR implant 12/19/23, presenting for followup of AF.   In 4/2023 he presented with sudden onset double vision that lasted about 4 days. He was seen in [facility] and workup was negative, including MRI, MRA of brain and neck, as well as ophthalmologic workup. There was concern for possible TIA, and was initially started on ASA 81mg qd.   Subsequently he wore an event monitor which revealed paroxysmal AF with RVR, and anticoagulation was changed to Xarelto (ASA stopped).   Event monitor performed 5/2 - 5/16/23 revealed SR (avg 65, range  bpm) and paroxysmal AF with episodes lasting up to 2 hours and 23 minutes (avg rate 119, range  bpm) c/w 1% AF burden during monitoring.  TTE and prior stress testing have been normal.   An ILR was implanted 12/19/23 for further monitoring. On ILR he has had recurrent episodes of pAF with RVR, with total 1% AT/AF burden. Episodes have occurred 1-2x per month, lasting between 3-6 hours with rapid rates ([id number] bpm). Most episodes have started overnight, and he has awoken feeling SOB.   He now reports increasingly bothersome symptoms, mostly dyspnea during pAF. He reports lightheadedness when standing quickly, but denies syncope.   He is tolerating anticoagulation and denies significant bleeding.  Due to sinus bradycardia he has not been on any rate control medications.   He is active in general but does not exercise regularly.  He does have a history of sleep apnea but was previously unable to tolerate CPAP. Recently in 2/2024 he had another sleep study showing mild ABENA. AutoPAP was recommended, and he has followup with sleep medicine coming up.   He has thyroid nodules and follows with his PMD. He is on thyroid replacement and thyroid levels have been monitored. He has not yet seen an endocrinologist.   ECG today reveals sinus rhythm 57 bpm, with  ms (first degere AVB) and narrow QRS Current meds include Xarelto 20mg qd, lisinopril, chlorthaldione, atorvastatin, Synthroid

## 2024-12-04 ENCOUNTER — APPOINTMENT (OUTPATIENT)
Dept: ELECTROPHYSIOLOGY | Facility: CLINIC | Age: 64
End: 2024-12-04
Payer: COMMERCIAL

## 2024-12-04 VITALS
DIASTOLIC BLOOD PRESSURE: 68 MMHG | WEIGHT: 202 LBS | HEIGHT: 68 IN | SYSTOLIC BLOOD PRESSURE: 114 MMHG | HEART RATE: 54 BPM | BODY MASS INDEX: 30.62 KG/M2

## 2024-12-04 DIAGNOSIS — I48.91 UNSPECIFIED ATRIAL FIBRILLATION: ICD-10-CM

## 2024-12-04 PROCEDURE — 93000 ELECTROCARDIOGRAM COMPLETE: CPT

## 2024-12-04 PROCEDURE — 99215 OFFICE O/P EST HI 40 MIN: CPT | Mod: 25

## 2024-12-05 ENCOUNTER — OUTPATIENT (OUTPATIENT)
Dept: OUTPATIENT SERVICES | Facility: HOSPITAL | Age: 64
LOS: 1 days | End: 2024-12-05
Payer: COMMERCIAL

## 2024-12-05 VITALS
SYSTOLIC BLOOD PRESSURE: 114 MMHG | RESPIRATION RATE: 18 BRPM | DIASTOLIC BLOOD PRESSURE: 66 MMHG | OXYGEN SATURATION: 96 % | HEART RATE: 60 BPM | WEIGHT: 200.62 LBS | TEMPERATURE: 98 F | HEIGHT: 68 IN

## 2024-12-05 DIAGNOSIS — Z01.818 ENCOUNTER FOR OTHER PREPROCEDURAL EXAMINATION: ICD-10-CM

## 2024-12-05 DIAGNOSIS — Z92.89 PERSONAL HISTORY OF OTHER MEDICAL TREATMENT: Chronic | ICD-10-CM

## 2024-12-05 DIAGNOSIS — Z98.890 OTHER SPECIFIED POSTPROCEDURAL STATES: Chronic | ICD-10-CM

## 2024-12-05 LAB
ALBUMIN SERPL ELPH-MCNC: 4.7 G/DL — SIGNIFICANT CHANGE UP (ref 3.3–5.2)
ALP SERPL-CCNC: 77 U/L — SIGNIFICANT CHANGE UP (ref 40–120)
ALT FLD-CCNC: 24 U/L — SIGNIFICANT CHANGE UP
ANION GAP SERPL CALC-SCNC: 9 MMOL/L — SIGNIFICANT CHANGE UP (ref 5–17)
APTT BLD: 38.8 SEC — HIGH (ref 24.5–35.6)
AST SERPL-CCNC: 23 U/L — SIGNIFICANT CHANGE UP
BILIRUB SERPL-MCNC: 0.7 MG/DL — SIGNIFICANT CHANGE UP (ref 0.4–2)
BLD GP AB SCN SERPL QL: SIGNIFICANT CHANGE UP
BUN SERPL-MCNC: 22.5 MG/DL — HIGH (ref 8–20)
CALCIUM SERPL-MCNC: 9.7 MG/DL — SIGNIFICANT CHANGE UP (ref 8.4–10.5)
CHLORIDE SERPL-SCNC: 100 MMOL/L — SIGNIFICANT CHANGE UP (ref 96–108)
CO2 SERPL-SCNC: 32 MMOL/L — HIGH (ref 22–29)
CREAT SERPL-MCNC: 0.82 MG/DL — SIGNIFICANT CHANGE UP (ref 0.5–1.3)
EGFR: 98 ML/MIN/1.73M2 — SIGNIFICANT CHANGE UP
GLUCOSE SERPL-MCNC: 118 MG/DL — HIGH (ref 70–99)
HCT VFR BLD CALC: 45.1 % — SIGNIFICANT CHANGE UP (ref 39–50)
HGB BLD-MCNC: 14.9 G/DL — SIGNIFICANT CHANGE UP (ref 13–17)
INR BLD: 1.28 RATIO — HIGH (ref 0.85–1.16)
MAGNESIUM SERPL-MCNC: 1.9 MG/DL — SIGNIFICANT CHANGE UP (ref 1.6–2.6)
MCHC RBC-ENTMCNC: 28.3 PG — SIGNIFICANT CHANGE UP (ref 27–34)
MCHC RBC-ENTMCNC: 33 G/DL — SIGNIFICANT CHANGE UP (ref 32–36)
MCV RBC AUTO: 85.7 FL — SIGNIFICANT CHANGE UP (ref 80–100)
PLATELET # BLD AUTO: 253 K/UL — SIGNIFICANT CHANGE UP (ref 150–400)
POTASSIUM SERPL-MCNC: 3.8 MMOL/L — SIGNIFICANT CHANGE UP (ref 3.5–5.3)
POTASSIUM SERPL-SCNC: 3.8 MMOL/L — SIGNIFICANT CHANGE UP (ref 3.5–5.3)
PROT SERPL-MCNC: 7.1 G/DL — SIGNIFICANT CHANGE UP (ref 6.6–8.7)
PROTHROM AB SERPL-ACNC: 14.8 SEC — HIGH (ref 9.9–13.4)
RBC # BLD: 5.26 M/UL — SIGNIFICANT CHANGE UP (ref 4.2–5.8)
RBC # FLD: 13.5 % — SIGNIFICANT CHANGE UP (ref 10.3–14.5)
SODIUM SERPL-SCNC: 141 MMOL/L — SIGNIFICANT CHANGE UP (ref 135–145)
WBC # BLD: 6.83 K/UL — SIGNIFICANT CHANGE UP (ref 3.8–10.5)
WBC # FLD AUTO: 6.83 K/UL — SIGNIFICANT CHANGE UP (ref 3.8–10.5)

## 2024-12-05 PROCEDURE — 93010 ELECTROCARDIOGRAM REPORT: CPT

## 2024-12-05 PROCEDURE — 85730 THROMBOPLASTIN TIME PARTIAL: CPT

## 2024-12-05 PROCEDURE — G0463: CPT

## 2024-12-05 PROCEDURE — 86850 RBC ANTIBODY SCREEN: CPT

## 2024-12-05 PROCEDURE — 85610 PROTHROMBIN TIME: CPT

## 2024-12-05 PROCEDURE — 86900 BLOOD TYPING SEROLOGIC ABO: CPT

## 2024-12-05 PROCEDURE — 80053 COMPREHEN METABOLIC PANEL: CPT

## 2024-12-05 PROCEDURE — 93005 ELECTROCARDIOGRAM TRACING: CPT

## 2024-12-05 PROCEDURE — 36415 COLL VENOUS BLD VENIPUNCTURE: CPT

## 2024-12-05 PROCEDURE — 83735 ASSAY OF MAGNESIUM: CPT

## 2024-12-05 PROCEDURE — 85027 COMPLETE CBC AUTOMATED: CPT

## 2024-12-05 PROCEDURE — 86901 BLOOD TYPING SEROLOGIC RH(D): CPT

## 2024-12-05 NOTE — H&P PST ADULT - HISTORY OF PRESENT ILLNESS
Department of Cardiology                                                                  [facility]/[place] [address]                                                            Telephone: [phone]. Fax:[phone]                                                                             Pre-EP Procedure H and P    HPI: 64 year old gentleman with history of possible TIA, MV prolapse with moderate MR HTN, HLD, thyroid nodule, ABENA, and paroxysmal atrial fibrillation s/p ILR implant 12/19/23, presenting for followup of paroxysmal AF. In 4/2023 he presented with sudden onset double vision that lasted about 4 days. He was seen in [facility] and workup was negative, including MRI, MRA of brain and neck, as well as ophthalmologic workup. There was concern for possible TIA, and was initially started on ASA 81mg qd. Subsequently he wore an event monitor which revealed paroxysmal AF with RVR, and anticoagulation was changed to Xarelto (ASA stopped).  ?  Event monitor performed 5/2 - 5/16/23 revealed SR (avg 65, range  bpm) and paroxysmal AF with episodes lasting up to 2 hours and 23 minutes (avg rate 119, range  bpm) c/w 1% AF burden during monitoring. TTE and prior stress testing have been normal. An ILR was implanted 12/19/23 for further monitoring. On ILR he has had recurrent episodes of pAF with RVR, with around1% AT/AF burden. Episodes have occurred 1-2x per month, lasting between 3-6 hours with rapid rates ([id number] bpm). Most episodes have started overnight, and he has sometimes awoken feeling SOB. He occasionally feels SOB during these episodes, but most times he is asymptomatic. He denies palpitation, dizziness, syncope or near syncope. Due to sinus bradycardia he has not been on any rate control medications. He is tolerating anticoagulation and denies significant bleeding. Today he presents to PST prior to PAF ablation, and he is in NSB.  ?  He does have a history of sleep apnea but was previously unable to tolerate CPAP. Recently in 2/2024 he had another sleep study showing mild ABENA. He is now awaiting an oral appliance for management of ABENA.  ?  He has thyroid nodules and follows with his PMD. He is on thyroid replacement and thyroid levels have been monitored. He has not yet seen an endocrinologist.  ?  ?  Current meds include Xarelto 20mg qd, lisinopril, atorvastatin, Synthroid, potassium supplement.      Echo:     ROS: as stated above, otherwise negative    PHYSICAL EXAM:  Constitutional: A & O x 3  HEENT:   Normal oral mucosa, PERRL, EOMI	  Cardiovascular: Normal S1 S2, No JVD, No murmurs  Respiratory: Lungs clear to auscultation	  Gastrointestinal:  Soft, Non-tender, + BS	  Skin: No rashes, No ecchymoses, No cyanosis  Neurologic: Non-focal  Extremities: Normal range of motion, no edema  Vascular: Peripheral pulses palpable + bilaterally           	                                                                                                           Department of Cardiology                                                                  [facility]/[place] [address]                                                            Telephone: [phone]. 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 In 4/2023 he presented with sudden onset double vision that lasted about 4 days. He was seen in [facility] and workup was negative, including MRI, MRA of brain and neck, as well as ophthalmologic workup. There was concern for possible TIA, and was initially started on ASA 81mg qd. Subsequently he wore an event monitor which revealed paroxysmal AF with RVR, and anticoagulation was changed to Xarelto (ASA stopped).  ?  Event monitor performed 5/2 - 5/16/23 revealed SR (avg 65, range  bpm) and paroxysmal AF with episodes lasting up to 2 hours and 23 minutes (avg rate 119, range  bpm) c/w 1% AF burden during monitoring. TTE and prior stress testing have been normal. An ILR was implanted 12/19/23 for further monitoring. On ILR he has had recurrent episodes of pAF with RVR, with around1% AT/AF burden. Episodes have occurred 1-2x per month, lasting between 3-6 hours with rapid rates ([id number] bpm). Most episodes have started overnight, and he has sometimes awoken feeling SOB. He occasionally feels SOB during these episodes, but most times he is asymptomatic. He denies palpitation, dizziness, syncope or near syncope. Due to sinus bradycardia he has not been on any rate control medications. He is tolerating anticoagulation and denies significant bleeding. Today he presents to PST prior to PAF ablation, and he is in NSB.  ?  He does have a history of sleep apnea but was previously unable to tolerate CPAP. Recently in 2/2024 he had another sleep study showing mild ABENA. He is now awaiting an oral appliance for management of ABENA.  ?  He has thyroid nodules and follows with his PMD. He is on thyroid replacement and thyroid levels have been monitored. He has not yet seen an endocrinologist.  ?  ?  Current meds include Xarelto 20mg qd, lisinopril, atorvastatin, Synthroid, potassium supplement.    Remote/Ambulatory Rhythm Monitoring: Monitor 5/2 - 5/16/23: SR (avg 65, range  bpm) and paroxysmal AF with episodes lasting up to 2 hours and 23 minutes (avg rate 119, range  bpm) c/w 1% AF burden during monitoring    Stress Test: ETT 10/12/21 no ischemia or induced arrhythmia. HR increased from 58 to 144 bpm. 11 METS    Echo: TTE 4/8/24: normal EF 59%, mild TR, moderate MR (with posterior MVP), LA 2.7cm, RVSP 34mmHg  TTE 5/1/23 LVEF 62%, LA 3.5cm, mod MR, mild TR, RVSP 25,      ROS: as stated above, otherwise negative    PHYSICAL EXAM:  Constitutional: A & O x 3  HEENT:   Normal oral mucosa, PERRL, EOMI	  Cardiovascular: Normal S1 S2, No JVD, No murmurs  Respiratory: Lungs clear to auscultation	  Gastrointestinal:  Soft, Non-tender, + BS	  Skin: No rashes, No ecchymoses, No cyanosis  Neurologic: Non-focal  Extremities: Normal range of motion, no edema  Vascular: Peripheral pulses palpable + bilaterally

## 2024-12-05 NOTE — H&P PST ADULT - ASSESSMENT
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 In 4/2023 he presented with sudden onset double vision that lasted about 4 days. He was seen in [facility] and workup was negative, including MRI, MRA of brain and neck, as well as ophthalmologic workup. There was concern for possible TIA, and was initially started on ASA 81mg qd. Subsequently he wore an event monitor which revealed paroxysmal AF with RVR, and anticoagulation was changed to Xarelto (ASA stopped). Today he presents to [facility] prior to PAF ablation, and he is in NSB.    Plan/Recommendations:   -plan for PAF ablation on 12/13/2024  -patient seen and examined in PST on 12/5/2024  -ECG and Labs reviewed  -NPO after midnight prior with exception of sip of water with morning medications  -Continue xarelto  -Pre-procedure instructions provided (verbal & written)   -Consent to be obtained by attending electrophysiologist on the scheduled procedure date

## 2024-12-13 ENCOUNTER — TRANSCRIPTION ENCOUNTER (OUTPATIENT)
Age: 64
End: 2024-12-13

## 2024-12-13 ENCOUNTER — INPATIENT (INPATIENT)
Facility: HOSPITAL | Age: 64
LOS: 0 days | Discharge: HOME | DRG: 310 | End: 2024-12-14
Attending: STUDENT IN AN ORGANIZED HEALTH CARE EDUCATION/TRAINING PROGRAM | Admitting: STUDENT IN AN ORGANIZED HEALTH CARE EDUCATION/TRAINING PROGRAM
Payer: COMMERCIAL

## 2024-12-13 VITALS
HEIGHT: 68 IN | TEMPERATURE: 98 F | WEIGHT: 199.96 LBS | DIASTOLIC BLOOD PRESSURE: 82 MMHG | RESPIRATION RATE: 15 BRPM | SYSTOLIC BLOOD PRESSURE: 144 MMHG | OXYGEN SATURATION: 98 % | HEART RATE: 59 BPM

## 2024-12-13 DIAGNOSIS — Z92.89 PERSONAL HISTORY OF OTHER MEDICAL TREATMENT: Chronic | ICD-10-CM

## 2024-12-13 DIAGNOSIS — Z98.890 OTHER SPECIFIED POSTPROCEDURAL STATES: Chronic | ICD-10-CM

## 2024-12-13 DIAGNOSIS — I48.0 PAROXYSMAL ATRIAL FIBRILLATION: ICD-10-CM

## 2024-12-13 LAB — ABO RH CONFIRMATION: SIGNIFICANT CHANGE UP

## 2024-12-13 PROCEDURE — 93656 COMPRE EP EVAL ABLTJ ATR FIB: CPT

## 2024-12-13 PROCEDURE — 93657 TX L/R ATRIAL FIB ADDL: CPT

## 2024-12-13 RX ORDER — RIVAROXABAN 10 MG/1
20 TABLET, FILM COATED ORAL
Refills: 0 | Status: DISCONTINUED | OUTPATIENT
Start: 2024-12-13 | End: 2024-12-14

## 2024-12-13 RX ORDER — MAGNESIUM, ALUMINUM HYDROXIDE 200-200 MG
30 TABLET,CHEWABLE ORAL EVERY 4 HOURS
Refills: 0 | Status: DISCONTINUED | OUTPATIENT
Start: 2024-12-13 | End: 2024-12-14

## 2024-12-13 RX ORDER — OXYCODONE HYDROCHLORIDE AND ACETAMINOPHEN 10; 325 MG/1; MG/1
1 TABLET ORAL EVERY 6 HOURS
Refills: 0 | Status: DISCONTINUED | OUTPATIENT
Start: 2024-12-13 | End: 2024-12-14

## 2024-12-13 RX ORDER — ATORVASTATIN CALCIUM 80 MG/1
20 TABLET, FILM COATED ORAL AT BEDTIME
Refills: 0 | Status: DISCONTINUED | OUTPATIENT
Start: 2024-12-13 | End: 2024-12-14

## 2024-12-13 RX ORDER — B1/B2/B3/B5/B6/B12/VIT C/FOLIC 500-0.5 MG
1 TABLET ORAL DAILY
Refills: 0 | Status: DISCONTINUED | OUTPATIENT
Start: 2024-12-13 | End: 2024-12-14

## 2024-12-13 RX ORDER — LISINOPRIL 5 MG/1
20 TABLET ORAL DAILY
Refills: 0 | Status: DISCONTINUED | OUTPATIENT
Start: 2024-12-13 | End: 2024-12-14

## 2024-12-13 RX ORDER — LEVOTHYROXINE SODIUM 300 MCG
137 TABLET ORAL DAILY
Refills: 0 | Status: DISCONTINUED | OUTPATIENT
Start: 2024-12-13 | End: 2024-12-14

## 2024-12-13 RX ORDER — LEVOTHYROXINE SODIUM 150 MCG
1 TABLET ORAL
Refills: 0 | DISCHARGE

## 2024-12-13 RX ORDER — ACETAMINOPHEN 500 MG/5ML
650 LIQUID (ML) ORAL EVERY 6 HOURS
Refills: 0 | Status: DISCONTINUED | OUTPATIENT
Start: 2024-12-13 | End: 2024-12-14

## 2024-12-13 RX ADMIN — Medication 650 MILLIGRAM(S): at 18:12

## 2024-12-13 RX ADMIN — Medication 650 MILLIGRAM(S): at 19:12

## 2024-12-13 RX ADMIN — Medication 50 MILLILITER(S): at 12:00

## 2024-12-13 RX ADMIN — ATORVASTATIN CALCIUM 20 MILLIGRAM(S): 80 TABLET, FILM COATED ORAL at 21:34

## 2024-12-13 RX ADMIN — Medication 1 TABLET(S): at 18:12

## 2024-12-13 RX ADMIN — RIVAROXABAN 20 MILLIGRAM(S): 10 TABLET, FILM COATED ORAL at 15:44

## 2024-12-14 ENCOUNTER — TRANSCRIPTION ENCOUNTER (OUTPATIENT)
Age: 64
End: 2024-12-14

## 2024-12-14 VITALS
HEART RATE: 71 BPM | DIASTOLIC BLOOD PRESSURE: 69 MMHG | RESPIRATION RATE: 18 BRPM | OXYGEN SATURATION: 95 % | TEMPERATURE: 98 F | SYSTOLIC BLOOD PRESSURE: 110 MMHG

## 2024-12-14 LAB
ANION GAP SERPL CALC-SCNC: 10 MMOL/L — SIGNIFICANT CHANGE UP (ref 5–17)
ANION GAP SERPL CALC-SCNC: 13 MMOL/L — SIGNIFICANT CHANGE UP (ref 5–17)
BUN SERPL-MCNC: 14.9 MG/DL — SIGNIFICANT CHANGE UP (ref 8–20)
BUN SERPL-MCNC: 17.8 MG/DL — SIGNIFICANT CHANGE UP (ref 8–20)
CALCIUM SERPL-MCNC: 8.6 MG/DL — SIGNIFICANT CHANGE UP (ref 8.4–10.5)
CALCIUM SERPL-MCNC: 8.9 MG/DL — SIGNIFICANT CHANGE UP (ref 8.4–10.5)
CHLORIDE SERPL-SCNC: 97 MMOL/L — SIGNIFICANT CHANGE UP (ref 96–108)
CHLORIDE SERPL-SCNC: 99 MMOL/L — SIGNIFICANT CHANGE UP (ref 96–108)
CO2 SERPL-SCNC: 25 MMOL/L — SIGNIFICANT CHANGE UP (ref 22–29)
CO2 SERPL-SCNC: 29 MMOL/L — SIGNIFICANT CHANGE UP (ref 22–29)
CREAT SERPL-MCNC: 0.75 MG/DL — SIGNIFICANT CHANGE UP (ref 0.5–1.3)
CREAT SERPL-MCNC: 0.77 MG/DL — SIGNIFICANT CHANGE UP (ref 0.5–1.3)
EGFR: 100 ML/MIN/1.73M2 — SIGNIFICANT CHANGE UP
EGFR: 100 ML/MIN/1.73M2 — SIGNIFICANT CHANGE UP
EGFR: 101 ML/MIN/1.73M2 — SIGNIFICANT CHANGE UP
EGFR: 101 ML/MIN/1.73M2 — SIGNIFICANT CHANGE UP
GLUCOSE SERPL-MCNC: 156 MG/DL — HIGH (ref 70–99)
GLUCOSE SERPL-MCNC: 157 MG/DL — HIGH (ref 70–99)
HCT VFR BLD CALC: 37.9 % — LOW (ref 39–50)
HGB BLD-MCNC: 13 G/DL — SIGNIFICANT CHANGE UP (ref 13–17)
MAGNESIUM SERPL-MCNC: 1.8 MG/DL — SIGNIFICANT CHANGE UP (ref 1.8–2.6)
MAGNESIUM SERPL-MCNC: 2.8 MG/DL — HIGH (ref 1.6–2.6)
MCHC RBC-ENTMCNC: 28.7 PG — SIGNIFICANT CHANGE UP (ref 27–34)
MCHC RBC-ENTMCNC: 34.3 G/DL — SIGNIFICANT CHANGE UP (ref 32–36)
MCV RBC AUTO: 83.7 FL — SIGNIFICANT CHANGE UP (ref 80–100)
PLATELET # BLD AUTO: 185 K/UL — SIGNIFICANT CHANGE UP (ref 150–400)
POTASSIUM SERPL-MCNC: 3.3 MMOL/L — LOW (ref 3.5–5.3)
POTASSIUM SERPL-MCNC: 3.6 MMOL/L — SIGNIFICANT CHANGE UP (ref 3.5–5.3)
POTASSIUM SERPL-SCNC: 3.3 MMOL/L — LOW (ref 3.5–5.3)
POTASSIUM SERPL-SCNC: 3.6 MMOL/L — SIGNIFICANT CHANGE UP (ref 3.5–5.3)
RBC # BLD: 4.53 M/UL — SIGNIFICANT CHANGE UP (ref 4.2–5.8)
RBC # FLD: 14.2 % — SIGNIFICANT CHANGE UP (ref 10.3–14.5)
SODIUM SERPL-SCNC: 136 MMOL/L — SIGNIFICANT CHANGE UP (ref 135–145)
SODIUM SERPL-SCNC: 137 MMOL/L — SIGNIFICANT CHANGE UP (ref 135–145)
WBC # BLD: 8.92 K/UL — SIGNIFICANT CHANGE UP (ref 3.8–10.5)
WBC # FLD AUTO: 8.92 K/UL — SIGNIFICANT CHANGE UP (ref 3.8–10.5)

## 2024-12-14 PROCEDURE — C1893: CPT

## 2024-12-14 PROCEDURE — C1731: CPT

## 2024-12-14 PROCEDURE — C1759: CPT

## 2024-12-14 PROCEDURE — 36415 COLL VENOUS BLD VENIPUNCTURE: CPT

## 2024-12-14 PROCEDURE — C9399: CPT

## 2024-12-14 PROCEDURE — 93005 ELECTROCARDIOGRAM TRACING: CPT

## 2024-12-14 PROCEDURE — 83735 ASSAY OF MAGNESIUM: CPT

## 2024-12-14 PROCEDURE — 93656 COMPRE EP EVAL ABLTJ ATR FIB: CPT

## 2024-12-14 PROCEDURE — C1733: CPT

## 2024-12-14 PROCEDURE — C1894: CPT

## 2024-12-14 PROCEDURE — C1769: CPT

## 2024-12-14 PROCEDURE — 93010 ELECTROCARDIOGRAM REPORT: CPT

## 2024-12-14 PROCEDURE — 93657 TX L/R ATRIAL FIB ADDL: CPT

## 2024-12-14 PROCEDURE — C1732: CPT

## 2024-12-14 PROCEDURE — 80048 BASIC METABOLIC PNL TOTAL CA: CPT

## 2024-12-14 PROCEDURE — 85027 COMPLETE CBC AUTOMATED: CPT

## 2024-12-14 RX ORDER — B1/B2/B3/B5/B6/B12/VIT C/FOLIC 500-0.5 MG
1 TABLET ORAL
Qty: 0 | Refills: 0 | DISCHARGE
Start: 2024-12-14

## 2024-12-14 RX ORDER — MAGNESIUM SULFATE 500 MG/ML
4 SYRINGE (ML) INJECTION ONCE
Refills: 0 | Status: COMPLETED | OUTPATIENT
Start: 2024-12-14 | End: 2024-12-14

## 2024-12-14 RX ADMIN — LISINOPRIL 20 MILLIGRAM(S): 5 TABLET ORAL at 05:53

## 2024-12-14 RX ADMIN — Medication 25 GRAM(S): at 07:58

## 2024-12-14 RX ADMIN — Medication 137 MICROGRAM(S): at 05:53

## 2024-12-14 RX ADMIN — Medication 40 MILLIEQUIVALENT(S): at 07:45

## 2024-12-14 RX ADMIN — Medication 50 MILLIEQUIVALENT(S): at 07:46

## 2024-12-14 RX ADMIN — Medication 1 TABLET(S): at 10:55

## 2024-12-14 RX ADMIN — Medication 40 MILLIGRAM(S): at 05:54

## 2025-01-29 ENCOUNTER — APPOINTMENT (OUTPATIENT)
Dept: ELECTROPHYSIOLOGY | Facility: CLINIC | Age: 65
End: 2025-01-29
Payer: COMMERCIAL

## 2025-01-29 VITALS
HEART RATE: 61 BPM | BODY MASS INDEX: 30.62 KG/M2 | DIASTOLIC BLOOD PRESSURE: 70 MMHG | SYSTOLIC BLOOD PRESSURE: 116 MMHG | WEIGHT: 202 LBS | HEIGHT: 68 IN

## 2025-01-29 DIAGNOSIS — Z86.79 OTHER SPECIFIED POSTPROCEDURAL STATES: ICD-10-CM

## 2025-01-29 DIAGNOSIS — Z98.890 OTHER SPECIFIED POSTPROCEDURAL STATES: ICD-10-CM

## 2025-01-29 DIAGNOSIS — I48.91 UNSPECIFIED ATRIAL FIBRILLATION: ICD-10-CM

## 2025-01-29 DIAGNOSIS — Z95.818 PRESENCE OF OTHER CARDIAC IMPLANTS AND GRAFTS: ICD-10-CM

## 2025-01-29 PROCEDURE — 99215 OFFICE O/P EST HI 40 MIN: CPT | Mod: 25

## 2025-01-29 PROCEDURE — 93000 ELECTROCARDIOGRAM COMPLETE: CPT | Mod: 59

## 2025-07-16 ENCOUNTER — APPOINTMENT (OUTPATIENT)
Dept: ELECTROPHYSIOLOGY | Facility: CLINIC | Age: 65
End: 2025-07-16
Payer: COMMERCIAL

## 2025-07-16 VITALS
HEART RATE: 54 BPM | HEIGHT: 68 IN | WEIGHT: 208 LBS | BODY MASS INDEX: 31.52 KG/M2 | DIASTOLIC BLOOD PRESSURE: 76 MMHG | SYSTOLIC BLOOD PRESSURE: 120 MMHG

## 2025-07-16 PROCEDURE — 99215 OFFICE O/P EST HI 40 MIN: CPT | Mod: 25

## 2025-07-16 PROCEDURE — 93000 ELECTROCARDIOGRAM COMPLETE: CPT
